# Patient Record
Sex: FEMALE | Race: BLACK OR AFRICAN AMERICAN | NOT HISPANIC OR LATINO | URBAN - METROPOLITAN AREA
[De-identification: names, ages, dates, MRNs, and addresses within clinical notes are randomized per-mention and may not be internally consistent; named-entity substitution may affect disease eponyms.]

---

## 2018-12-21 ENCOUNTER — IMPORTED ENCOUNTER (OUTPATIENT)
Dept: URBAN - METROPOLITAN AREA CLINIC 9 | Facility: CLINIC | Age: 58
End: 2018-12-21

## 2019-04-22 NOTE — PATIENT DISCUSSION
New Prescription: erythromycin (erythromycin): ointment: 5 mg/gram (0.5 %) a small amount at bedtime into affected eye 04-

## 2019-10-24 ENCOUNTER — IMPORTED ENCOUNTER (OUTPATIENT)
Dept: URBAN - METROPOLITAN AREA CLINIC 9 | Facility: CLINIC | Age: 59
End: 2019-10-24

## 2020-01-31 ENCOUNTER — IMPORTED ENCOUNTER (OUTPATIENT)
Dept: URBAN - METROPOLITAN AREA CLINIC 9 | Facility: CLINIC | Age: 60
End: 2020-01-31

## 2020-11-19 ENCOUNTER — IMPORTED ENCOUNTER (OUTPATIENT)
Dept: URBAN - METROPOLITAN AREA CLINIC 9 | Facility: CLINIC | Age: 60
End: 2020-11-19

## 2021-02-11 ENCOUNTER — IMPORTED ENCOUNTER (OUTPATIENT)
Dept: URBAN - METROPOLITAN AREA CLINIC 9 | Facility: CLINIC | Age: 61
End: 2021-02-11

## 2021-05-03 ENCOUNTER — IMPORTED ENCOUNTER (OUTPATIENT)
Dept: URBAN - METROPOLITAN AREA CLINIC 9 | Facility: CLINIC | Age: 61
End: 2021-05-03

## 2021-05-19 ENCOUNTER — IMPORTED ENCOUNTER (OUTPATIENT)
Dept: URBAN - METROPOLITAN AREA CLINIC 9 | Facility: CLINIC | Age: 61
End: 2021-05-19

## 2021-08-17 ENCOUNTER — IMPORTED ENCOUNTER (OUTPATIENT)
Dept: URBAN - METROPOLITAN AREA CLINIC 9 | Facility: CLINIC | Age: 61
End: 2021-08-17

## 2021-08-17 PROBLEM — H40.1131: Noted: 2021-08-17

## 2021-10-17 ASSESSMENT — TONOMETRY
OD_IOP_MMHG: 14
OD_IOP_MMHG: 12
OD_IOP_MMHG: 15
OD_IOP_MMHG: 11
OS_IOP_MMHG: 15
OS_IOP_MMHG: 14
OS_IOP_MMHG: 15
OS_IOP_MMHG: 11
OS_IOP_MMHG: 12
OS_IOP_MMHG: 12
OS_IOP_MMHG: 16
OD_IOP_MMHG: 14
OD_IOP_MMHG: 11
OD_IOP_MMHG: 13

## 2021-10-17 ASSESSMENT — VISUAL ACUITY
OS_CC: 20/30 + SN
OD_CC: 20/25 SN
OS_CC: 20/25 -2 SN
OS_CC: 20/25 + SN
OS_CC: 20/25 SN
OS_CC: 20/30 SN
OD_CC: 20/40 + SN
OD_CC: 20/40 + SN
OD_CC: 20/30 - SN
OS_CC: 20/25 SN
OD_CC: 20/30 -2 SN
OD_CC: 20/30 - SN
OS_CC: 20/30 + SN
OD_CC: 20/25 SN
OS_CC: 20/30 SN
OD_CC: 20/25 -2 SN
OS_CC: 20/25 -2 SN

## 2021-12-07 ENCOUNTER — ESTABLISHED PATIENT (OUTPATIENT)
Dept: URBAN - METROPOLITAN AREA CLINIC 4 | Facility: CLINIC | Age: 61
End: 2021-12-07

## 2021-12-07 DIAGNOSIS — H40.1131: ICD-10-CM

## 2021-12-07 DIAGNOSIS — H11.041: ICD-10-CM

## 2021-12-07 DIAGNOSIS — H11.152: ICD-10-CM

## 2021-12-07 DIAGNOSIS — H25.13: ICD-10-CM

## 2021-12-07 DIAGNOSIS — E11.9: ICD-10-CM

## 2021-12-07 PROCEDURE — 92250 FUNDUS PHOTOGRAPHY W/I&R: CPT

## 2021-12-07 PROCEDURE — 99214 OFFICE O/P EST MOD 30 MIN: CPT

## 2021-12-07 ASSESSMENT — TONOMETRY
OS_IOP_MMHG: 12
OD_IOP_MMHG: 12

## 2021-12-07 ASSESSMENT — VISUAL ACUITY
OD_SC: 20/40
OS_SC: 20/40
OD_GLARE: 20/50
OS_GLARE: 20/100

## 2022-03-10 ENCOUNTER — ESTABLISHED PATIENT (OUTPATIENT)
Dept: URBAN - METROPOLITAN AREA CLINIC 4 | Facility: CLINIC | Age: 62
End: 2022-03-10

## 2022-03-10 DIAGNOSIS — H40.1131: ICD-10-CM

## 2022-03-10 PROCEDURE — 92020 GONIOSCOPY: CPT

## 2022-03-10 PROCEDURE — 99213 OFFICE O/P EST LOW 20 MIN: CPT

## 2022-03-10 ASSESSMENT — VISUAL ACUITY
OD_CC: 20/30-2
OU_CC: 20/20-2
OS_CC: 20/30

## 2022-03-10 ASSESSMENT — TONOMETRY
OD_IOP_MMHG: 15
OS_IOP_MMHG: 15

## 2022-04-15 ENCOUNTER — TECH ONLY (OUTPATIENT)
Dept: URBAN - METROPOLITAN AREA CLINIC 4 | Facility: CLINIC | Age: 62
End: 2022-04-15

## 2022-04-15 DIAGNOSIS — H25.13: ICD-10-CM

## 2022-04-15 PROCEDURE — 92015 DETERMINE REFRACTIVE STATE: CPT

## 2022-06-13 ENCOUNTER — ESTABLISHED PATIENT (OUTPATIENT)
Dept: URBAN - METROPOLITAN AREA CLINIC 4 | Facility: CLINIC | Age: 62
End: 2022-06-13

## 2022-06-13 DIAGNOSIS — H40.1131: ICD-10-CM

## 2022-06-13 PROCEDURE — 92083 EXTENDED VISUAL FIELD XM: CPT

## 2022-06-13 PROCEDURE — 99213 OFFICE O/P EST LOW 20 MIN: CPT

## 2022-06-13 PROCEDURE — 92133 CPTRZD OPH DX IMG PST SGM ON: CPT

## 2022-06-13 ASSESSMENT — VISUAL ACUITY
OD_CC: 20/30-2
OU_CC: 20/20
OS_CC: 20/25-1

## 2022-06-13 ASSESSMENT — TONOMETRY
OD_IOP_MMHG: 16
OS_IOP_MMHG: 16

## 2022-07-08 ENCOUNTER — CLINIC PROCEDURE ONLY (OUTPATIENT)
Dept: URBAN - METROPOLITAN AREA CLINIC 4 | Facility: CLINIC | Age: 62
End: 2022-07-08

## 2022-07-08 DIAGNOSIS — H40.1131: ICD-10-CM

## 2022-07-08 PROCEDURE — 65855 TRABECULOPLASTY LASER SURG: CPT

## 2022-07-08 ASSESSMENT — TONOMETRY: OD_IOP_MMHG: 16

## 2022-07-08 ASSESSMENT — VISUAL ACUITY: OD_SC: 20/30

## 2022-08-12 ENCOUNTER — CLINIC PROCEDURE ONLY (OUTPATIENT)
Dept: URBAN - METROPOLITAN AREA CLINIC 4 | Facility: CLINIC | Age: 62
End: 2022-08-12

## 2022-08-12 DIAGNOSIS — H40.1131: ICD-10-CM

## 2022-08-12 PROCEDURE — 65855 TRABECULOPLASTY LASER SURG: CPT

## 2022-08-12 ASSESSMENT — TONOMETRY: OS_IOP_MMHG: 11

## 2022-09-23 ENCOUNTER — ESTABLISHED PATIENT (OUTPATIENT)
Dept: URBAN - METROPOLITAN AREA CLINIC 4 | Facility: CLINIC | Age: 62
End: 2022-09-23

## 2022-09-23 DIAGNOSIS — H40.1131: ICD-10-CM

## 2022-09-23 PROCEDURE — 99213 OFFICE O/P EST LOW 20 MIN: CPT

## 2022-09-23 ASSESSMENT — VISUAL ACUITY
OD_CC: 20/25
OU_CC: 20/20
OS_CC: 20/25+1

## 2022-09-23 ASSESSMENT — TONOMETRY
OD_IOP_MMHG: 16
OS_IOP_MMHG: 13

## 2022-10-10 ENCOUNTER — EMERGENCY VISIT (OUTPATIENT)
Dept: URBAN - METROPOLITAN AREA CLINIC 4 | Facility: CLINIC | Age: 62
End: 2022-10-10

## 2022-10-10 DIAGNOSIS — H00.14: ICD-10-CM

## 2022-10-10 PROCEDURE — 92285 EXTERNAL OCULAR PHOTOGRAPHY: CPT

## 2022-10-10 PROCEDURE — 99213 OFFICE O/P EST LOW 20 MIN: CPT

## 2022-10-10 ASSESSMENT — TONOMETRY
OD_IOP_MMHG: 16
OS_IOP_MMHG: 17

## 2022-10-10 ASSESSMENT — VISUAL ACUITY
OS_CC: 20/30
OD_CC: 20/25-2

## 2023-01-06 ENCOUNTER — ESTABLISHED PATIENT (OUTPATIENT)
Dept: URBAN - METROPOLITAN AREA CLINIC 4 | Facility: CLINIC | Age: 63
End: 2023-01-06

## 2023-01-06 DIAGNOSIS — E11.9: ICD-10-CM

## 2023-01-06 DIAGNOSIS — H40.1131: ICD-10-CM

## 2023-01-06 DIAGNOSIS — H11.041: ICD-10-CM

## 2023-01-06 DIAGNOSIS — H25.13: ICD-10-CM

## 2023-01-06 PROCEDURE — 92020 GONIOSCOPY: CPT

## 2023-01-06 PROCEDURE — 99214 OFFICE O/P EST MOD 30 MIN: CPT

## 2023-01-06 PROCEDURE — 92250 FUNDUS PHOTOGRAPHY W/I&R: CPT

## 2023-01-06 ASSESSMENT — TONOMETRY
OD_IOP_MMHG: 15
OS_IOP_MMHG: 15

## 2023-01-06 ASSESSMENT — VISUAL ACUITY
OS_CC: 20/30+2
OD_CC: 20/25+3
